# Patient Record
Sex: FEMALE | Race: WHITE | NOT HISPANIC OR LATINO | Employment: FULL TIME | URBAN - METROPOLITAN AREA
[De-identification: names, ages, dates, MRNs, and addresses within clinical notes are randomized per-mention and may not be internally consistent; named-entity substitution may affect disease eponyms.]

---

## 2025-07-07 ENCOUNTER — APPOINTMENT (OUTPATIENT)
Dept: OBSTETRICS AND GYNECOLOGY | Facility: CLINIC | Age: 50
End: 2025-07-07
Payer: COMMERCIAL

## 2025-07-07 VITALS
BODY MASS INDEX: 30.41 KG/M2 | WEIGHT: 176.4 LBS | HEART RATE: 111 BPM | DIASTOLIC BLOOD PRESSURE: 94 MMHG | SYSTOLIC BLOOD PRESSURE: 164 MMHG

## 2025-07-07 DIAGNOSIS — A60.9 HSV (HERPES SIMPLEX VIRUS) ANOGENITAL INFECTION: Primary | ICD-10-CM

## 2025-07-07 DIAGNOSIS — Z11.3 ROUTINE SCREENING FOR STI (SEXUALLY TRANSMITTED INFECTION): ICD-10-CM

## 2025-07-07 DIAGNOSIS — R30.0 DYSURIA: ICD-10-CM

## 2025-07-07 PROCEDURE — 99213 OFFICE O/P EST LOW 20 MIN: CPT | Performed by: ADVANCED PRACTICE MIDWIFE

## 2025-07-07 PROCEDURE — 1036F TOBACCO NON-USER: CPT | Performed by: ADVANCED PRACTICE MIDWIFE

## 2025-07-07 RX ORDER — VENLAFAXINE HYDROCHLORIDE 75 MG/1
75 CAPSULE, EXTENDED RELEASE ORAL DAILY
COMMUNITY

## 2025-07-07 RX ORDER — UBROGEPANT 100 MG/1
TABLET ORAL
COMMUNITY
Start: 2023-09-01

## 2025-07-07 RX ORDER — MAGNESIUM 200 MG
TABLET ORAL
COMMUNITY

## 2025-07-07 RX ORDER — METHOTREXATE 25 MG/ML
INJECTION INTRA-ARTERIAL; INTRAMUSCULAR; INTRATHECAL; INTRAVENOUS
COMMUNITY

## 2025-07-07 RX ORDER — VIT C/E/ZN/COPPR/LUTEIN/ZEAXAN 250MG-90MG
CAPSULE ORAL
COMMUNITY

## 2025-07-07 RX ORDER — HYDROGEN PEROXIDE 3 %
SOLUTION, NON-ORAL MISCELLANEOUS
COMMUNITY

## 2025-07-07 RX ORDER — ACYCLOVIR 800 MG/1
800 TABLET ORAL 2 TIMES DAILY
Qty: 10 TABLET | Refills: 0 | Status: SHIPPED | OUTPATIENT
Start: 2025-07-07 | End: 2025-07-12

## 2025-07-07 RX ORDER — FREMANEZUMAB-VFRM 225 MG/1.5ML
225 INJECTION SUBCUTANEOUS
COMMUNITY
Start: 2024-09-11

## 2025-07-07 RX ORDER — METHOCARBAMOL 500 MG/1
500-1000 TABLET, FILM COATED ORAL
COMMUNITY
Start: 2023-09-01

## 2025-07-07 RX ORDER — TRAZODONE HYDROCHLORIDE 50 MG/1
50 TABLET ORAL
COMMUNITY
Start: 2025-06-05

## 2025-07-07 RX ORDER — TOFACITINIB 11 MG/1
11 TABLET, FILM COATED, EXTENDED RELEASE ORAL
COMMUNITY
Start: 2025-05-01

## 2025-07-07 RX ORDER — MODAFINIL 200 MG/1
1 TABLET ORAL
COMMUNITY
Start: 2024-12-05

## 2025-07-07 RX ORDER — ACYCLOVIR 50 MG/G
OINTMENT TOPICAL
Qty: 15 G | Refills: 0 | Status: SHIPPED | OUTPATIENT
Start: 2025-07-07

## 2025-07-07 RX ORDER — DEXTROAMPHETAMINE SACCHARATE, AMPHETAMINE ASPARTATE MONOHYDRATE, DEXTROAMPHETAMINE SULFATE AND AMPHETAMINE SULFATE 1.25; 1.25; 1.25; 1.25 MG/1; MG/1; MG/1; MG/1
5 CAPSULE, EXTENDED RELEASE ORAL
COMMUNITY
Start: 2025-06-25

## 2025-07-07 RX ORDER — AMLODIPINE BESYLATE 2.5 MG/1
2.5 TABLET ORAL
COMMUNITY
Start: 2023-07-07

## 2025-07-07 RX ORDER — CHOLECALCIFEROL (VITAMIN D3) 25 MCG
TABLET,CHEWABLE ORAL
COMMUNITY

## 2025-07-07 ASSESSMENT — ENCOUNTER SYMPTOMS
CARDIOVASCULAR NEGATIVE: 0
PSYCHIATRIC NEGATIVE: 0
MUSCULOSKELETAL NEGATIVE: 0
HEMATOLOGIC/LYMPHATIC NEGATIVE: 0
RESPIRATORY NEGATIVE: 0
CONSTITUTIONAL NEGATIVE: 0
EYES NEGATIVE: 0
GASTROINTESTINAL NEGATIVE: 0
NEUROLOGICAL NEGATIVE: 0
ALLERGIC/IMMUNOLOGIC NEGATIVE: 0
ENDOCRINE NEGATIVE: 0

## 2025-07-07 ASSESSMENT — PAIN SCALES - GENERAL: PAINLEVEL_OUTOF10: 5

## 2025-07-07 NOTE — PROGRESS NOTES
Subjective   Patient ID: Christine Mehta is a 50 y.o. female who presents for Pelvic Pain (Pt here for pelvic pain/Lmp-hysterectomy /Last pap-7/9/14/Last mamm-2024/Chaperone-accepted ).    HPI  Patient presents with external vaginal burning she has been experiencing for the last 4 days. She reports her partner took a photo of her vagina and it looks like vaginal ulcers. She is not having vaginal itching or discharge. She reports she has burning when urinating. There is no vaginal odor that she has noticed. She reports having a male and female partner. She has had genital herpes in 1995 but has not had an outbreak since.      Review of Systems   Genitourinary:  Positive for genital sores and vaginal pain.     Objective   BP (!) 164/94   Pulse (!) 111   Wt 80 kg (176 lb 6.4 oz)   BMI 30.41 kg/m²   Physical Exam  Constitutional:       Appearance: Normal appearance. She is well-developed.   Genitourinary:      Urethral meatus normal.      Right Labia: No rash, tenderness or lesions.     Left Labia: tenderness and lesions.      Left Labia: No rash.           No vaginal prolapse present.     No vaginal atrophy present.       Right Adnexa: not tender, not full and no mass present.     Left Adnexa: not tender, not full and no mass present.     Cervix is not absent.      Uterus is not absent.     Pelvic exam was performed with patient in the lithotomy position.   Pulmonary:      Effort: Pulmonary effort is normal.   Neurological:      Mental Status: She is alert.   Psychiatric:         Mood and Affect: Mood and affect normal.         Behavior: Behavior normal. Behavior is cooperative.   Vitals reviewed.     Assessment/Plan   Diagnoses and all orders for this visit:  HSV (herpes simplex virus) anogenital infection  -     exam c/w herpes lesions  -     comfort measures reviewed  -     start acyclovir (Zovirax) 800 mg tablet; Take 1 tablet (800 mg) by mouth 2 times a day for 5 days.  -     acyclovir (Zovirax) 5 %  ointment; Apply to affected area up every 3 hours as needed  -       safe sexual practices to decrease partner transmission reviewed, pt to consider suppressive therapy  Dysuria  - UA neg  - likely in s/o HSV lesion  - encouraged using grant bottle to dilute urine, pt does have bidet she can use  Routine screening for STI (sexually transmitted infection)  -     C. trachomatis / N. gonorrhoeae, Amplified, Urogenital  -     Hepatitis C Antibody; Future  -     HIV 1/2 Antigen/Antibody Screen with Reflex to Confirmation; Future  -     Syphilis Screen with Reflex; Future  -     Trichomonas vaginalis, Amplified    Follow up if symptoms worsen or fail to improve.      Velma Alcazar, CAROL-CNM, APRN-CNP 07/07/25 4:30 PM

## 2025-07-07 NOTE — PROGRESS NOTES
"Subjective   Christine Mehta is a 50 y.o. female who is here for No chief complaint on file..     Concerns today:  ***    {Childbearing or Kenna/postmenopausal?:53613}    Sexual Activity: ***sexually active, ***male partners  Pain with intercourse? {Yes/No:90845}  Loss of desire? {Yes/No:76761}  Able to have an orgasm? {Yes/No:63521}    History of prior STI: {STI:44334}  Desires STI screening? {yes/no:47837}    Current contraception: {contraceptive method:5051}    Last pap: ***  History of abnormal Pap smear: {yes***/no:36511::\"no\"}  Family history of uterine or ovarian cancer: {yes***/no:47932::\"no\"}    Last mammogram: ***  History of abnormal mammogram: {yes***/no:20591::\"no\"}  Family history of breast cancer: {yes***/no:25008::\"no\"}  OB History   No obstetric history on file.       IPV screening:  Have you ever experienced any form of emotional, physical, sexual or financial abuse? {yes***/no:26179::\"no\"}  Have you ever been hit, pushed, bitten, kicked, choked or grabbed by your partner or an ex-partner? {yes***/no:98206::\"no\"}  Do you ever feel scared or threatened by your partner or ex-partner? {yes***/no:30945::\"no\"}     Objective   There were no vitals taken for this visit.  OBGyn Exam    Assessment/Plan   {Assess/PlanSmartLinks:32009}  -     routine AE  -     healthy lifestyle encouraged  -     pap & HPV collected  -     condoms as needed for STI prevention  -     Gardasil UTD***  -     Recommend PCP visit for routine health maintenance  -     RTO 1 year for AE or sooner PRN    No follow-ups on file.    Velma Alcazar, APRN-CNM, APRN-CNP    "

## 2025-07-08 LAB
C TRACH RRNA SPEC QL NAA+PROBE: NOT DETECTED
N GONORRHOEA RRNA SPEC QL NAA+PROBE: NOT DETECTED
QUEST GC CT AMPLIFIED (ALWAYS MESSAGE): NORMAL
T VAGINALIS RRNA SPEC QL NAA+PROBE: NOT DETECTED